# Patient Record
(demographics unavailable — no encounter records)

---

## 2024-12-16 NOTE — HISTORY OF PRESENT ILLNESS
[FreeTextEntry1] : Pt was started on leflunomide in June 2024. However, she only took it for approx 1 month because of nausea. She discontinued it approx 3 weeks ago. She has been taking 2.5 mg prednisone, and has been feeling well on this prednisone dose. She denies any current joint pain, stiffness or swelling.   Previous treatments: - Methotrexate- couldn't tolerate due to GI side effects - Leflunomide - couldn't tolerate due to GI side effects  Previous HPI: Pt was diagnosed with RA in 2006, after delivering her 2nd child. She has been taking prednisone almost continuously since that time, up to 40 mg q day, most recently taking 2.5 mg q day. She has also been taking methotrexate since 2021, although she ran out of it several months ago. She used to follow [facility], last seen 3/2023. s/p hand x-rays in 2021 with early erosive changes in b/l 2nd-4th metacarpals. Pt reports that off of prednisone, she has pain in her hands, also has intermittent pain in her knees, worse with eating red meat or rice, and recently she has been having neck and upper back pain that she thinks may be related to stress. + Dry eyes, pt says her eye doctor said it may be a side effect of prednisone? Pt also takes prn Aleve but developed hives on her face so she thinks she may be allergic to it.  Physical exam: GEN: Pleasant, AAO woman sitting on exam table in NAD SKIN: No rashes PULM: Clear to auscultation b/l CV: Regular rate and rhythm, no murmurs MSK: Neck: Full ROM Shoulders: Full ROM b/l Elbows: Full ROM b/l, no effusions Wrists: Full ROM b/l, no effusions Hands: no synovitis Hips: Full ROM b/l Knees: no effusions, full ROM b/l Ankles: no effusions, full ROM b/l Feet: no effusions, no TTP EXT: No LE edema b/l

## 2024-12-16 NOTE — ASSESSMENT
[FreeTextEntry1] : Seropositive erosive RA: CCP>250, , 2021 x-rays previously showed erosions in the hands but pt had 2024 hand x-rays which were unremarkable? She is currently feeling well symptoms-wise, on 2.5 mg prednisone daily. She also took leflunomide for 1 month but discontinued it approx 3 weeks ago due to GI side effects. Unclear at this point whether the leflunomide is still helping with her joints? - f/u ESR, CRP and routine labwork - Continue prednisone 2.5 mg q day monotherapy for now if pt is feeling well. Would consider starting Humira depending on how pt is feeling on prednisone monotherapy  f/u in 3 months, will call pt with lab results